# Patient Record
Sex: MALE | Race: AMERICAN INDIAN OR ALASKA NATIVE
[De-identification: names, ages, dates, MRNs, and addresses within clinical notes are randomized per-mention and may not be internally consistent; named-entity substitution may affect disease eponyms.]

---

## 2017-08-03 NOTE — EMERGENCY DEPARTMENT REPORT
HPI





- General


Chief Complaint: Hypoglycemia


Time Seen by Provider: 08/03/17 10:49





- HPI


HPI: 


This is a 61-year-old Afro-American male presents to the emergency department 

with complaint of some transient confusion and possible hypoglycemia.  The 

patient went out to his car around 5:30 AM to go to work and then woke up there 

now or so later not remembering that he went out to his car.  The patient has a 

history of insulin dependent diabetes and thought he might be hypoglycemic and 

took a sugar pill.  He then drove himself into the hospital but still felt 

foggy.  He is now had some peanut butter and juice and says that he feels 

improved but not yet back to 100%.  He denies any current headache, vision 

change, chest pain, shortness of breath.  He has a history of insulin-dependent 

diabetes for which she takes regular insulin on a sliding scale at meals and 70/

30 at 25 units twice daily.   He goes to a clinic in [facility] for his 

primary care needs.  He also has a history of hypertension.  No history of MI, 

CVA, PE/DVT.  No recent travel or sick contacts at home.








ED Past Medical Hx





- Past Medical History


Previous Medical History?: Yes


Hx Hypertension: Yes


Hx Diabetes: Yes





- Surgical History


Past Surgical History?: Yes


Hx Appendectomy: Yes





- Social History


Smoking Status: Current Some Day Smoker


Substance Use Type: Alcohol





- Medications


Home Medications: 


 Home Medications











 Medication  Instructions  Recorded  Confirmed  Last Taken  Type


 


Acetaminophen/Codeine [Tylenol #3] 1 tab PO Q6H PRN #10 tab 05/20/16 08/03/17 08 /03/17 Rx


 


Furosemide [Lasix] 20 mg PO QDAY #30 tablet 05/20/16 08/03/17 08/03/17 Rx


 


Insulin NPH/Regular [Novolin 70/30] 25 unit SQ BID 05/20/16 08/03/17 08/03/17 

History


 


Insulin Regular, Human [HumuLIN R] 1 unit SQ Q6HR 05/20/16 08/03/17 08/03/17 

History


 


amLODIPine [Norvasc] 5 mg PO DAILY #30 tab 05/20/16 08/03/17 08/03/17 Rx














ED Review of Systems


ROS: 


Stated complaint: CONFUSION


Other details as noted in HPI





Comment: All other systems reviewed and negative


Constitutional: denies: chills, fever


Eyes: denies: eye pain, eye discharge, vision change


ENT: denies: ear pain, throat pain


Respiratory: denies: cough, shortness of breath, wheezing


Cardiovascular: syncope.  denies: chest pain, palpitations


Gastrointestinal: denies: abdominal pain, nausea, diarrhea


Genitourinary: denies: urgency, dysuria


Musculoskeletal: denies: back pain, joint swelling, arthralgia


Skin: denies: rash, lesions


Neurological: confusion.  denies: headache, weakness, paresthesias





Physical Exam





- Physical Exam


Vital Signs: 


 Vital Signs











  08/03/17





  09:57


 


Temperature 97.3 F L


 


Pulse Rate 96 H


 


Respiratory 16





Rate 


 


Blood Pressure 160/97











Physical Exam: 


GENERAL: The patient is well-developed well-nourished.


HEENT: Normocephalic.  Atraumatic.  Extraocular motions are intact.  Patient 

has moist mucous membranes.  Pupils equal reactive to light bilaterally.  No 

nystagmus.


NECK: Supple.  Trachea is midline.


CHEST/LUNGS: Clear to auscultation.  There is no respiratory distress noted.


HEART/CARDIOVASCULAR: Regular.  There is no tachycardia.  There is no gallop 

rub or murmur.


ABDOMEN: Abdomen is soft, nontender.  Patient has normal bowel sounds.  There 

is no abdominal distention.


SKIN: Skin is warm and dry.


NEURO: The patient is awake, alert, and oriented.  The patient is cooperative.  

The patient has no focal neurologic deficits.  The patient has normal speech.  

Cranial nerves II through XII grossly intact.


MUSCULOSKELETAL: There is no tenderness or deformity.  There is no limitation 

range of motion.  There is no evidence of acute injury.  Muscle strength 5 out 

of 5 upper and lower extremities bilaterally.








ED Course


 Vital Signs











  08/03/17





  09:57


 


Temperature 97.3 F L


 


Pulse Rate 96 H


 


Respiratory 16





Rate 


 


Blood Pressure 160/97














ED Medical Decision Making





- Lab Data


Result diagrams: 


 08/03/17 10:17





 08/03/17 10:17





- EKG Data


-: EKG Interpreted by Me


EKG shows normal: sinus rhythm, axis, intervals, QRS complexes, ST-T waves


Rate: normal





- EKG Data


When compared to previous EKG there are: previous EKG unavailable


Interpretation: normal EKG





- Medical Decision Making


61-year-old male presents to the emergency department after he had some 

confusion and/or altered mental status earlier.  He checked his sugar and found 

himself to have low blood sugar and took a sugar pill at home.  Here the blood 

sugar was about 70 upon arrival.  He was given peanut butter and juice.  His 

blood sugar was rechecked about 2 hours later and his blood sugar level was 

about 30.  He was then given an amp of D50 and a full meal to eat.  His blood 

sugar went up to 180 and then about 250.  The patient was once again 

hypoglycemic he started feeling nauseated and "off", but when the patient had 

the D50 and full meal he says he again feeling much better and is currently 

back at baseline.  The patient admits that he is not sure whether he had any 

dinner last night or breakfast this morning but still did continue with his 

normal diabetes/insulin regiment.  This is most likely the reason for his 

symptoms today.  There is been no focal, motor or sensory deficits and his 

cranial nerves are intact throughout his ED course.  Vital signs stable.  He 

will be given referrals for primary care and encouraged to return to the 

emergency department with any worsening of symptoms or any acute distress.








- Differential Diagnosis


diabetic hypoglycemia, hypothyroidism, MI


Critical Care Time: No


Critical care attestation.: 


If time is entered above; I have spent that time in minutes in the direct care 

of this critically ill patient, excluding procedure time.








ED Disposition


Clinical Impression: 


 Diabetic hypoglycemia





Disposition: DC-01 TO HOME OR SELFCARE


Is pt being admited?: No


Condition: Stable


Instructions:  Diabetic Hypoglycemia (ED)


Additional Instructions: 


Please follow-up with a primary care physician in the next few days.  Make sure 

to eat 3 meals a day if you are going to take your insulin normally.  Keep a 

blood sugar log.  Return to the emergency department with any worsening of your 

symptoms or any acute distress.


Referrals: 


HOUSTON BROWN MD [Primary Care Provider] - 3-5 Days


ELLIE SOMMERS MD [Staff Physician] - 3-5 Days


[facility] [Outside] - 3-5 Days


[facility] [Outside] - 3-5 Days


[facility] [Outside] - 3-5 Days


Time of Disposition: 16:17

## 2019-03-02 ENCOUNTER — HOSPITAL ENCOUNTER (EMERGENCY)
Dept: HOSPITAL 5 - ED | Age: 64
Discharge: HOME | End: 2019-03-02
Payer: SELF-PAY

## 2019-03-02 DIAGNOSIS — R51: Primary | ICD-10-CM

## 2019-03-02 DIAGNOSIS — Z79.4: ICD-10-CM

## 2019-03-02 DIAGNOSIS — E11.9: ICD-10-CM

## 2019-03-02 DIAGNOSIS — H57.89: ICD-10-CM

## 2019-03-02 DIAGNOSIS — Z88.8: ICD-10-CM

## 2019-03-02 DIAGNOSIS — I10: ICD-10-CM

## 2019-03-02 DIAGNOSIS — R11.2: ICD-10-CM

## 2019-03-02 LAB
BASOPHILS # (AUTO): 0 K/MM3 (ref 0–0.1)
BASOPHILS NFR BLD AUTO: 0.4 % (ref 0–1.8)
BUN SERPL-MCNC: 10 MG/DL (ref 9–20)
BUN/CREAT SERPL: 10 %
CALCIUM SERPL-MCNC: 8.9 MG/DL (ref 8.4–10.2)
EOSINOPHIL # BLD AUTO: 0 K/MM3 (ref 0–0.4)
EOSINOPHIL NFR BLD AUTO: 0 % (ref 0–4.3)
HCT VFR BLD CALC: 45.4 % (ref 35.5–45.6)
HEMOLYSIS INDEX: 83
HGB BLD-MCNC: 16.2 GM/DL (ref 11.8–15.2)
LYMPHOCYTES # BLD AUTO: 0.8 K/MM3 (ref 1.2–5.4)
LYMPHOCYTES NFR BLD AUTO: 7.5 % (ref 13.4–35)
MCHC RBC AUTO-ENTMCNC: 36 % (ref 32–34)
MCV RBC AUTO: 92 FL (ref 84–94)
MONOCYTES # (AUTO): 0.8 K/MM3 (ref 0–0.8)
MONOCYTES % (AUTO): 7.3 % (ref 0–7.3)
PLATELET # BLD: 223 K/MM3 (ref 140–440)
RBC # BLD AUTO: 4.95 M/MM3 (ref 3.65–5.03)

## 2019-03-02 PROCEDURE — 36415 COLL VENOUS BLD VENIPUNCTURE: CPT

## 2019-03-02 PROCEDURE — 85025 COMPLETE CBC W/AUTO DIFF WBC: CPT

## 2019-03-02 PROCEDURE — 99284 EMERGENCY DEPT VISIT MOD MDM: CPT

## 2019-03-02 PROCEDURE — 96374 THER/PROPH/DIAG INJ IV PUSH: CPT

## 2019-03-02 PROCEDURE — 70450 CT HEAD/BRAIN W/O DYE: CPT

## 2019-03-02 PROCEDURE — 80048 BASIC METABOLIC PNL TOTAL CA: CPT

## 2019-03-02 PROCEDURE — 96375 TX/PRO/DX INJ NEW DRUG ADDON: CPT

## 2019-03-02 NOTE — CAT SCAN REPORT
CT HEAD/BRAIN WO CON 

 

CLINICAL INDICATION: 

Male, 63 years of age. headache 

 

COMPARISON: 

None 

 

TECHNIQUE: 

Contiguous axial images were obtained from the vertex through the skull base.This CT exam was perform
ed using one or more of the following dose reduction techniques: automated exposure control, adjustme
nt of the mA and/or kV according to patient size, or use of iterative reconstruction technique.  

 

FINDINGS: 

No acute intracranial hemorrhage, midline shift, or pathological extra-axial fluid collection.  There
 is age-related volume loss with compensatory dilatation of the ventricular system and prominence of 
the overlying sulci.  Patchy areas of decreased attenuation within the subcortical and periventricula
r white matter compatible with the sequelae of chronic small vessel ischemic disease. Small chronic a
ppearing lacunar infarcts in the bilateral basal ganglia. Ocular globes are grossly unremarkable. Sabas
varium is grossly intact. 

 

Mild mucosal thickening in the paranasal sinuses. Mastoid air cells are clear. 

 

IMPRESSION: 

 

No grossly acute intracranial abnormality. Mild volume loss and chronic small vessel ischemic disease
. 

 

  

 

This document is electronically signed by Matt Broderick DO., March 2 2019 06:11:30 PM ET

## 2019-03-03 VITALS — SYSTOLIC BLOOD PRESSURE: 172 MMHG | DIASTOLIC BLOOD PRESSURE: 73 MMHG

## 2019-03-10 ENCOUNTER — HOSPITAL ENCOUNTER (INPATIENT)
Dept: HOSPITAL 5 - ED | Age: 64
LOS: 3 days | Discharge: HOME | DRG: 71 | End: 2019-03-13
Attending: INTERNAL MEDICINE | Admitting: INTERNAL MEDICINE
Payer: SELF-PAY
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
## 2019-03-10 DIAGNOSIS — E86.0: ICD-10-CM

## 2019-03-10 DIAGNOSIS — I10: ICD-10-CM

## 2019-03-10 DIAGNOSIS — I42.6: ICD-10-CM

## 2019-03-10 DIAGNOSIS — Z83.3: ICD-10-CM

## 2019-03-10 DIAGNOSIS — F10.20: ICD-10-CM

## 2019-03-10 DIAGNOSIS — Z79.899: ICD-10-CM
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
## 2019-03-10 DIAGNOSIS — G93.40: Primary | ICD-10-CM
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
## 2019-03-10 DIAGNOSIS — Z82.49: ICD-10-CM

## 2019-03-10 DIAGNOSIS — Z90.49: ICD-10-CM

## 2019-03-10 DIAGNOSIS — E11.649: ICD-10-CM
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
## 2019-03-10 DIAGNOSIS — Z79.4: ICD-10-CM

## 2019-03-10 DIAGNOSIS — M62.82: ICD-10-CM

## 2019-03-10 DIAGNOSIS — R55: ICD-10-CM

## 2019-03-10 DIAGNOSIS — Z88.6: ICD-10-CM
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
## 2019-03-10 DIAGNOSIS — Z71.41: ICD-10-CM

## 2019-03-10 LAB
ALBUMIN SERPL-MCNC: 4.1 G/DL (ref 3.9–5)
ALT SERPL-CCNC: 24 UNITS/L (ref 7–56)
APTT BLD: 28.1 SEC. (ref 24.2–36.6)
BASOPHILS # (AUTO): 0.1 K/MM3 (ref 0–0.1)
BASOPHILS NFR BLD AUTO: 0.7 % (ref 0–1.8)
BENZODIAZEPINES SCREEN,URINE: (no result)
BILIRUB DIRECT SERPL-MCNC: 0.2 MG/DL (ref 0–0.2)
BILIRUB UR QL STRIP: (no result)
BLOOD UR QL VISUAL: (no result)
BUN SERPL-MCNC: 20 MG/DL (ref 9–20)
BUN/CREAT SERPL: 15 %
CALCIUM SERPL-MCNC: 8.9 MG/DL (ref 8.4–10.2)
EOSINOPHIL # BLD AUTO: 0 K/MM3 (ref 0–0.4)
EOSINOPHIL NFR BLD AUTO: 0 % (ref 0–4.3)
HCT VFR BLD CALC: 42.7 % (ref 35.5–45.6)
HDLC SERPL-MCNC: 96 MG/DL (ref 40–59)
HEMOLYSIS INDEX: 11
HGB BLD-MCNC: 15 GM/DL (ref 11.8–15.2)
INR PPP: 0.93 (ref 0.87–1.13)
LYMPHOCYTES # BLD AUTO: 0.8 K/MM3 (ref 1.2–5.4)
LYMPHOCYTES NFR BLD AUTO: 6.2 % (ref 13.4–35)
MCHC RBC AUTO-ENTMCNC: 35 % (ref 32–34)
MCV RBC AUTO: 92 FL (ref 84–94)
METHADONE SCREEN,URINE: (no result)
MONOCYTES # (AUTO): 1 K/MM3 (ref 0–0.8)
MONOCYTES % (AUTO): 7.8 % (ref 0–7.3)
OPIATE SCREEN,URINE: (no result)
PH UR STRIP: 7 [PH] (ref 5–7)
PLATELET # BLD: 208 K/MM3 (ref 140–440)
RBC # BLD AUTO: 4.64 M/MM3 (ref 3.65–5.03)
RBC #/AREA URNS HPF: 5 /HPF (ref 0–6)
UROBILINOGEN UR-MCNC: < 2 MG/DL (ref ?–2)
WBC #/AREA URNS HPF: < 1 /HPF (ref 0–6)

## 2019-03-10 PROCEDURE — 93010 ELECTROCARDIOGRAM REPORT: CPT

## 2019-03-10 PROCEDURE — 83036 HEMOGLOBIN GLYCOSYLATED A1C: CPT

## 2019-03-10 PROCEDURE — G0480 DRUG TEST DEF 1-7 CLASSES: HCPCS

## 2019-03-10 PROCEDURE — 70450 CT HEAD/BRAIN W/O DYE: CPT

## 2019-03-10 PROCEDURE — 82140 ASSAY OF AMMONIA: CPT

## 2019-03-10 PROCEDURE — 90686 IIV4 VACC NO PRSV 0.5 ML IM: CPT

## 2019-03-10 PROCEDURE — 81001 URINALYSIS AUTO W/SCOPE: CPT

## 2019-03-10 PROCEDURE — 83880 ASSAY OF NATRIURETIC PEPTIDE: CPT

## 2019-03-10 PROCEDURE — 93306 TTE W/DOPPLER COMPLETE: CPT

## 2019-03-10 PROCEDURE — 80048 BASIC METABOLIC PNL TOTAL CA: CPT

## 2019-03-10 PROCEDURE — 84484 ASSAY OF TROPONIN QUANT: CPT

## 2019-03-10 PROCEDURE — 82805 BLOOD GASES W/O2 SATURATION: CPT

## 2019-03-10 PROCEDURE — 80076 HEPATIC FUNCTION PANEL: CPT

## 2019-03-10 PROCEDURE — 93970 EXTREMITY STUDY: CPT

## 2019-03-10 PROCEDURE — 85730 THROMBOPLASTIN TIME PARTIAL: CPT

## 2019-03-10 PROCEDURE — 82962 GLUCOSE BLOOD TEST: CPT

## 2019-03-10 PROCEDURE — 93880 EXTRACRANIAL BILAT STUDY: CPT

## 2019-03-10 PROCEDURE — 85025 COMPLETE CBC W/AUTO DIFF WBC: CPT

## 2019-03-10 PROCEDURE — 70544 MR ANGIOGRAPHY HEAD W/O DYE: CPT

## 2019-03-10 PROCEDURE — 36415 COLL VENOUS BLD VENIPUNCTURE: CPT

## 2019-03-10 PROCEDURE — 70551 MRI BRAIN STEM W/O DYE: CPT

## 2019-03-10 PROCEDURE — 71045 X-RAY EXAM CHEST 1 VIEW: CPT

## 2019-03-10 PROCEDURE — 93005 ELECTROCARDIOGRAM TRACING: CPT

## 2019-03-10 PROCEDURE — 82550 ASSAY OF CK (CPK): CPT

## 2019-03-10 PROCEDURE — 82553 CREATINE MB FRACTION: CPT

## 2019-03-10 PROCEDURE — 80307 DRUG TEST PRSMV CHEM ANLYZR: CPT

## 2019-03-10 PROCEDURE — 83735 ASSAY OF MAGNESIUM: CPT

## 2019-03-10 PROCEDURE — 80320 DRUG SCREEN QUANTALCOHOLS: CPT

## 2019-03-10 PROCEDURE — 80061 LIPID PANEL: CPT

## 2019-03-10 PROCEDURE — 85610 PROTHROMBIN TIME: CPT

## 2019-03-10 RX ADMIN — FUROSEMIDE SCH MG: 20 TABLET ORAL at 16:22

## 2019-03-10 RX ADMIN — SODIUM CHLORIDE SCH MLS/HR: 0.45 INJECTION, SOLUTION INTRAVENOUS at 15:30

## 2019-03-10 RX ADMIN — SODIUM CHLORIDE SCH MLS/HR: 0.45 INJECTION, SOLUTION INTRAVENOUS at 22:48

## 2019-03-10 RX ADMIN — Medication SCH ML: at 21:10

## 2019-03-10 RX ADMIN — FOLIC ACID SCH MG: 1 TABLET ORAL at 12:47

## 2019-03-10 RX ADMIN — AMLODIPINE BESYLATE SCH MG: 5 TABLET ORAL at 16:23

## 2019-03-10 NOTE — HISTORY AND PHYSICAL REPORT
History of Present Illness


Chief complaint: 





I feel bad


History of present illness: 


64 YO Male with HTN, DM, ETOH Dependence presents to ED for evaluation. Pt 

states that he was in his usual state of health at bedtime around 2200 hrs, but 

he experienced generalized weakness with worsened symptoms in his right arm and 

leg and an acute onset of slurred speech shortly after awaking from sleep this 

morning. Pt transported to [facility] via private vehicle. Pt seen and evaluated in ED 

and found to have symptoms consistent with CVA as well as Rhabdomyolysis. Pt 

admitted to telemetry and initiated on CVA protocol. Pt lase ingstion of ETOH 

was on the night prior to admission. Pt denies fever, chills, CP, Palpitations, 

NVD, Trauma, BRBPR, Skin Rash, Unintentional weight loss, or night sweats. 

Neurology consulted in ED. 





Past History


Past Medical History: diabetes, hypertension


Past Surgical History: appendectomy


Social history: , lives with family, alcohol abuse


Family history: diabetes, hypertension





Medications and Allergies


                                    Allergies











Allergy/AdvReac Type Severity Reaction Status Date / Time


 


lisinopril Allergy  Unknown Verified 03/10/19 11:21











                                Home Medications











 Medication  Instructions  Recorded  Confirmed  Last Taken  Type


 


Acetaminophen/Codeine [Tylenol #3] 1 tab PO Q6H PRN #10 tab 05/20/16 08/03/17 08/03/17 Rx


 


Furosemide [Lasix] 20 mg PO QDAY #30 tablet 05/20/16 08/03/17 08/03/17 Rx


 


Insulin NPH/Regular [Novolin 70/30] 25 unit SQ BID 05/20/16 08/03/17 08/03/17 

History


 


Insulin Regular, Human [HumuLIN R] 1 unit SQ Q6HR 05/20/16 08/03/17 08/03/17 

History


 


amLODIPine [Norvasc] 5 mg PO DAILY #30 tab 05/20/16 08/03/17 08/03/17 Rx


 


Butalb/Acetamin/Caff -40 1 tab PO Q6HR PRN #10 tab 03/02/19  Unknown Rx





[Fioricet]     














Review of Systems


Constitutional: no weight loss, no weight gain, no fever, no chills


Ears, nose, mouth and throat: no ear pain, no ear discharge, no tinnitis, no 

decreased hearing, no nose pain, no nasal congestion


Cardiovascular: no chest pain, no orthopnea, no palpitations, no syncope


Respiratory: no cough, no cough with sputum, no excessive sputum, no hemoptysis,

 no shortness of breath


Gastrointestinal: no nausea, no vomiting, no diarrhea, no constipation


Genitourinary Male: no hematuria, no flank pain, no discharge, no urinary 

frequency, no urinary hesitancy


Rectal: no pain, no incontinence, no bleeding


Musculoskeletal: no neck stiffness, no neck pain, no shooting arm pain, no arm 

numbness/tingling, no low back pain, no shooting leg pain


Integumentary: no rash, no pruritis, no redness, no sores, no wounds


Neurological: weakness, aphasia, change in speech, gait dysfunction, motor 

disturbance, no seizures, no syncope, no tremors


Psychiatric: no anxiety, no memory loss, no change in sleep habits, no sleep 

disturbances, no hypersomnia


Endocrine: no cold intolerance, no heat intolerance, no polyphagia, no excessive

 thirst, no polydipsia, no polyuria


Hematologic/Lymphatic: no easy bruising, no easy bleeding, no lymphadenopathy, 

no lymphedema


Allergic/Immunologic: no urticaria, no allergic rhinitis, no wheezing





Exam





- Constitutional


Vitals: 


                                        











Temp Pulse Resp BP Pulse Ox


 


 98.6 F   94 H  20   172/91   98 


 


 03/10/19 07:27  03/10/19 07:27  03/10/19 07:27  03/10/19 07:27  03/10/19 07:27











General appearance: Present: mild distress





- EENT


Eyes: Present: PERRL


ENT: hearing intact, clear oral mucosa





- Neck


Neck: Present: supple, normal ROM





- Respiratory


Respiratory effort: normal


Respiratory: bilateral: CTA





- Cardiovascular


Heart Sounds: Present: S1 & S2.  Absent: rub, click





- Extremities


Extremities: pulses symmetrical, No edema


Peripheral Pulses: within normal limits





- Abdominal


General gastrointestinal: Present: soft, non-tender, non-distended, normal bowel

 sounds


Male genitourinary: Present: normal





- Integumentary


Integumentary: Present: clear, warm, dry





- Musculoskeletal


Musculoskeletal: generalized weakness





- Psychiatric


Psychiatric: appropriate mood/affect, intact judgment & insight





- Neurologic


Neurologic: CNII-XII intact, moves all extremities, no gait normal





Results





- Labs


CBC & Chem 7: 


                                 03/10/19 07:57





                                 03/10/19 07:57


Labs: 


                              Abnormal lab results











  03/10/19 03/10/19 03/10/19 Range/Units





  07:35 07:57 07:57 


 


WBC   12.4 H   (4.5-11.0)  K/mm3


 


MCHC   35 H   (32-34)  %


 


RDW   12.3 L   (13.2-15.2)  %


 


Lymph % (Auto)   6.2 L   (13.4-35.0)  %


 


Mono % (Auto)   7.8 H   (0.0-7.3)  %


 


Lymph #   0.8 L   (1.2-5.4)  K/mm3


 


Mono #   1.0 H   (0.0-0.8)  K/mm3


 


Seg Neutrophils %   85.3 H   (40.0-70.0)  %


 


Seg Neutrophils #   10.6 H   (1.8-7.7)  K/mm3


 


Sodium    136 L  (137-145)  mmol/L


 


Carbon Dioxide    21 L  (22-30)  mmol/L


 


Glucose    260 H  ()  mg/dL


 


POC Glucose  224 H    ()  


 


AST     (5-40)  units/L


 


Total Creatine Kinase     ()  units/L


 


CK-MB (CK-2)     (0.0-4.0)  ng/mL


 


Troponin T     (0.00-0.029)  ng/mL


 


NT-Pro-B Natriuret Pep     (0-900)  pg/mL


 


Cholesterol     ()  mg/dL


 


HDL Cholesterol     (40-59)  mg/dL














  03/10/19 03/10/19 Range/Units





  07:57 07:57 


 


WBC    (4.5-11.0)  K/mm3


 


MCHC    (32-34)  %


 


RDW    (13.2-15.2)  %


 


Lymph % (Auto)    (13.4-35.0)  %


 


Mono % (Auto)    (0.0-7.3)  %


 


Lymph #    (1.2-5.4)  K/mm3


 


Mono #    (0.0-0.8)  K/mm3


 


Seg Neutrophils %    (40.0-70.0)  %


 


Seg Neutrophils #    (1.8-7.7)  K/mm3


 


Sodium    (137-145)  mmol/L


 


Carbon Dioxide    (22-30)  mmol/L


 


Glucose    ()  mg/dL


 


POC Glucose    ()  


 


AST   72 H  (5-40)  units/L


 


Total Creatine Kinase  4205 H   ()  units/L


 


CK-MB (CK-2)  19.5 H   (0.0-4.0)  ng/mL


 


Troponin T   0.050 H  (0.00-0.029)  ng/mL


 


NT-Pro-B Natriuret Pep   2942 H  (0-900)  pg/mL


 


Cholesterol   208 H  ()  mg/dL


 


HDL Cholesterol   96 H  (40-59)  mg/dL














Assessment and Plan





- Patient Problems


(1) CVA (cerebral vascular accident)


Current Visit: Yes   Status: Acute   


Qualifiers: 


   Precerebral and cerebral artery: middle cerebral artery   Laterality of 

affected vessel: left 


Plan to address problem: 


Stroke Protocol: Admit to telemetry, CT head, MRI Brain, MRA Brain, Echo, 

Carotid Doppler, Echo, PT/OT/Speech, Antiplatelet therapy, lipid panel, statin 

therapy, 








(2) EtOH dependence


Current Visit: Yes   Status: Acute   


Qualifiers: 


   Substance use status: uncomplicated   Qualified Code(s): F10.20 - Alcohol 

dependence, uncomplicated   


Plan to address problem: 


Thiamine, Folic Acid, Multivitamin, CIWA protocol,








(3) Cardiomyopathy


Current Visit: Yes   Status: Acute   


Qualifiers: 


   Cardiomyopathy type: alcoholic   Qualified Code(s): I42.6 - Alcoholic 

cardiomyopathy   


Plan to address problem: 


Supportive care, Strict I/O, Daily weight, ETOH cessation, BNP, monitor uop q 

shift, 








(4) Rhabdomyolysis


Current Visit: Yes   Status: Acute   


Qualifiers: 


   Rhabdomyolysis type: non-traumatic   Qualified Code(s): M62.82 - 

Rhabdomyolysis   


Plan to address problem: 


IVF resuscitation therapy, IV bicarbonate, CK level in AM, urinalysis, monitor 

uop q shift, monitor serum creatnine for signs of renal failure. 








(5) DVT prophylaxis


Current Visit: Yes   Status: Acute   


Plan to address problem: 


SCD to BLE while in bed.

## 2019-03-10 NOTE — EMERGENCY DEPARTMENT REPORT
ED General Adult HPI





- General


Chief complaint: Hyperglycemia


Stated complaint: DIABETES


Time Seen by Provider: 03/10/19 07:45


Source: patient


Mode of arrival: Ambulatory


Limitations: No Limitations





- History of Present Illness


Initial comments: 


Is a 63-year-old man who thought it was a good idea to drink alcohol to raise 

his blood sugar when he found it to be low at about or p.m. yesterday he states.

 He states he had some slurred speech but it has improved.  His bit nonspecific 

about the amount of alcohol he typically drinks her how much she drank last 

night.  He is an insulin-dependent diabetic.  He is obviously poorly managing 

his blood sugar but I believe he is compliant with his insulin.  At time my exam

the patient does not complain of headache.  However he states he was seen here 2

days ago for a bad headache and had a CT examination.


In actuality, the patient was seen on 03/02/2019 rather than 2 days ago:





63-year-old male with temporal headache since this morning.  Patient initially 

hypertensive upon ED arrival.  Neurologic exam normal.  CT scan did not show any

acute findings.  Patient was given morphine, Zofran, Reglan, Benadryl.  Patient 

reported improvement of pain following medication administration.  The patient 

currently improved to 142/69. Patient has tolerated PO, ate an entire meal tray 

here in ED.  Will discharge at this time.  Return precautions given. Outpatient 

follow-up advised.





Again at this time patient complains of no headache.  He denies any focal 

weakness or numbness.  He doesn't report any difficulty with coordination gait 

or vision.


-: Gradual


Severity scale (0 -10): 0





- Related Data


                                Home Medications











 Medication  Instructions  Recorded  Confirmed  Last Taken


 


Insulin NPH/Regular [Novolin 70/30] 25 unit SQ BID 05/20/16 08/03/17 08/03/17


 


Insulin Regular, Human [HumuLIN R] 1 unit SQ Q6HR 05/20/16 08/03/17 08/03/17








                                  Previous Rx's











 Medication  Instructions  Recorded  Last Taken  Type


 


Acetaminophen/Codeine [Tylenol #3] 1 tab PO Q6H PRN #10 tab 05/20/16 08/03/17 Rx


 


Furosemide [Lasix] 20 mg PO QDAY #30 tablet 05/20/16 08/03/17 Rx


 


amLODIPine [Norvasc] 5 mg PO DAILY #30 tab 05/20/16 08/03/17 Rx


 


Butalb/Acetamin/Caff -40 1 tab PO Q6HR PRN #10 tab 03/02/19 Unknown Rx





[Fioricet]    











                                    Allergies











Allergy/AdvReac Type Severity Reaction Status Date / Time


 


lisinopril Allergy  Unknown Verified 03/10/19 07:30














ED Review of Systems


ROS: 


Stated complaint: DIABETES


Other details as noted in HPI





Constitutional: denies: chills, fever


Eyes: denies: eye pain, eye discharge, vision change


ENT: denies: ear pain, throat pain


Respiratory: denies: cough, shortness of breath, wheezing


Cardiovascular: denies: chest pain, palpitations


Endocrine: no symptoms reported


Gastrointestinal: denies: abdominal pain, nausea, diarrhea


Genitourinary: denies: urgency, dysuria


Musculoskeletal: denies: back pain, joint swelling, arthralgia


Skin: denies: rash, lesions


Neurological: other (slurred speech).  denies: headache, weakness, paresthesias


Psychiatric: denies: anxiety, depression


Hematological/Lymphatic: denies: easy bleeding, easy bruising





ED Past Medical Hx





- Past Medical History


Hx Hypertension: Yes


Hx Diabetes: Yes





- Surgical History


Hx Appendectomy: Yes





- Social History


Smoking Status: Never Smoker


Substance Use Type: None





- Medications


Home Medications: 


                                Home Medications











 Medication  Instructions  Recorded  Confirmed  Last Taken  Type


 


Acetaminophen/Codeine [Tylenol #3] 1 tab PO Q6H PRN #10 tab 05/20/16 08/03/17 08/03/17 Rx


 


Furosemide [Lasix] 20 mg PO QDAY #30 tablet 05/20/16 08/03/17 08/03/17 Rx


 


Insulin NPH/Regular [Novolin 70/30] 25 unit SQ BID 05/20/16 08/03/17 08/03/17 

History


 


Insulin Regular, Human [HumuLIN R] 1 unit SQ Q6HR 05/20/16 08/03/17 08/03/17 

History


 


amLODIPine [Norvasc] 5 mg PO DAILY #30 tab 05/20/16 08/03/17 08/03/17 Rx


 


Butalb/Acetamin/Caff -40 1 tab PO Q6HR PRN #10 tab 03/02/19  Unknown Rx





[Fioricet]     














ED Physical Exam





- General


Limitations: No Limitations


General appearance: alert, in no apparent distress





- Head


Head exam: Present: atraumatic, normocephalic





- Eye


Eye exam: Present: normal appearance, PERRL, EOMI.  Absent: scleral icterus





- ENT


ENT exam: Present: mucous membranes moist





- Neck


Neck exam: Present: normal inspection.  Absent: tenderness, meningismus





- Respiratory


Respiratory exam: Present: normal lung sounds bilaterally.  Absent: respiratory 

distress





- Cardiovascular


Cardiovascular Exam: Present: regular rate, normal rhythm.  Absent: systolic 

murmur, diastolic murmur, rubs, gallop





- GI/Abdominal


GI/Abdominal exam: Present: soft, normal bowel sounds.  Absent: distended, 

tenderness, guarding, rebound, rigid





- Rectal


Rectal exam: Present: deferred





- Extremities Exam


Extremities exam: Present: normal inspection





- Back Exam


Back exam: Present: normal inspection





- Neurological Exam


Neurological exam: Present: alert, oriented X3, CN II-XII intact, other (the 

patient's NIH stroke score is 0.  His speech is not slurred.).  Absent: motor 

sensory deficit





- Psychiatric


Psychiatric exam: Present: normal affect, normal mood





- Skin


Skin exam: Present: warm, dry, intact, normal color.  Absent: rash





ED Course


                                   Vital Signs











  03/10/19





  07:27


 


Temperature 98.6 F


 


Pulse Rate 94 H


 


Respiratory 20





Rate 


 


Blood Pressure 172/91





[Right] 


 


O2 Sat by Pulse 98





Oximetry 














- Reevaluation(s)


Reevaluation #1: 


Patient is found to have evidence of cardiomyopathy and rhabdomyolysis.  His 

urine drug screen is here pending.  His alcohol level was actually not s

ignificantly presents.  He is diabetic.  I think it would be worthwhile to admit

 him for treatment of his rhabdomyolysis which will have to be done in a very 

gradual fashion considering his cardiomyopathy, as well as, further evaluation 

of his "slurred speech", i.e., stroke workup.


03/10/19 10:50





Reevaluation #2: 


Discussed with Dr. Nelson.  He will admit.


03/10/19 11:13








ED Medical Decision Making





- Lab Data


Result diagrams: 


                                 03/10/19 07:57





                                 03/10/19 07:57








                         Laboratory Results - last 24 hr











  03/10/19 03/10/19 03/10/19





  07:35 07:57 07:57


 


WBC   12.4 H 


 


RBC   4.64 


 


Hgb   15.0 


 


Hct   42.7 


 


MCV   92 


 


MCH   32 


 


MCHC   35 H 


 


RDW   12.3 L 


 


Plt Count   208 


 


Lymph % (Auto)   6.2 L 


 


Mono % (Auto)   7.8 H 


 


Eos % (Auto)   0.0 


 


Baso % (Auto)   0.7 


 


Lymph #   0.8 L 


 


Mono #   1.0 H 


 


Eos #   0.0 


 


Baso #   0.1 


 


Seg Neutrophils %   85.3 H 


 


Seg Neutrophils #   10.6 H 


 


PT   


 


INR   


 


APTT   


 


VBG pH   


 


Sodium    136 L


 


Potassium    4.6


 


Chloride    99.4


 


Carbon Dioxide    21 L


 


Anion Gap    20


 


BUN    20


 


Creatinine    1.3


 


Estimated GFR    > 60


 


BUN/Creatinine Ratio    15


 


Glucose    260 H


 


POC Glucose  224 H  


 


Lactic Acid   


 


Calcium    8.9


 


Magnesium   


 


Total Bilirubin   


 


Direct Bilirubin   


 


Indirect Bilirubin   


 


AST   


 


ALT   


 


Alkaline Phosphatase   


 


Ammonia   


 


Total Creatine Kinase   


 


CK-MB (CK-2)   


 


CK-MB (CK-2) Rel Index   


 


Troponin T   


 


NT-Pro-B Natriuret Pep   


 


Total Protein   


 


Albumin   


 


Albumin/Globulin Ratio   


 


Triglycerides   


 


Cholesterol   


 


LDL Cholesterol Direct   


 


HDL Cholesterol   


 


Cholesterol/HDL Ratio   


 


Plasma/Serum Alcohol   














  03/10/19 03/10/19 03/10/19





  07:57 07:57 07:57


 


WBC   


 


RBC   


 


Hgb   


 


Hct   


 


MCV   


 


MCH   


 


MCHC   


 


RDW   


 


Plt Count   


 


Lymph % (Auto)   


 


Mono % (Auto)   


 


Eos % (Auto)   


 


Baso % (Auto)   


 


Lymph #   


 


Mono #   


 


Eos #   


 


Baso #   


 


Seg Neutrophils %   


 


Seg Neutrophils #   


 


PT   


 


INR   


 


APTT   


 


VBG pH  7.406  


 


Sodium   


 


Potassium   


 


Chloride   


 


Carbon Dioxide   


 


Anion Gap   


 


BUN   


 


Creatinine   


 


Estimated GFR   


 


BUN/Creatinine Ratio   


 


Glucose   


 


POC Glucose   


 


Lactic Acid   


 


Calcium   


 


Magnesium   


 


Total Bilirubin   


 


Direct Bilirubin   


 


Indirect Bilirubin   


 


AST   


 


ALT   


 


Alkaline Phosphatase   


 


Ammonia   


 


Total Creatine Kinase   4205 H 


 


CK-MB (CK-2)   19.5 H 


 


CK-MB (CK-2) Rel Index   0.4 


 


Troponin T   


 


NT-Pro-B Natriuret Pep   


 


Total Protein   


 


Albumin   


 


Albumin/Globulin Ratio   


 


Triglycerides   


 


Cholesterol   


 


LDL Cholesterol Direct   


 


HDL Cholesterol   


 


Cholesterol/HDL Ratio   


 


Plasma/Serum Alcohol    < 0.01














  03/10/19 03/10/19 03/10/19





  07:57 07:57 07:57


 


WBC   


 


RBC   


 


Hgb   


 


Hct   


 


MCV   


 


MCH   


 


MCHC   


 


RDW   


 


Plt Count   


 


Lymph % (Auto)   


 


Mono % (Auto)   


 


Eos % (Auto)   


 


Baso % (Auto)   


 


Lymph #   


 


Mono #   


 


Eos #   


 


Baso #   


 


Seg Neutrophils %   


 


Seg Neutrophils #   


 


PT  13.0  


 


INR  0.93  


 


APTT  28.1  


 


VBG pH   


 


Sodium   


 


Potassium   


 


Chloride   


 


Carbon Dioxide   


 


Anion Gap   


 


BUN   


 


Creatinine   


 


Estimated GFR   


 


BUN/Creatinine Ratio   


 


Glucose   


 


POC Glucose   


 


Lactic Acid    1.80


 


Calcium   


 


Magnesium   2.30 


 


Total Bilirubin   0.80 


 


Direct Bilirubin   0.2 


 


Indirect Bilirubin   0.6 


 


AST   72 H 


 


ALT   24 


 


Alkaline Phosphatase   72 


 


Ammonia   


 


Total Creatine Kinase   


 


CK-MB (CK-2)   


 


CK-MB (CK-2) Rel Index   


 


Troponin T   0.050 H 


 


NT-Pro-B Natriuret Pep   2942 H 


 


Total Protein   7.0 


 


Albumin   4.1 


 


Albumin/Globulin Ratio   1.4 


 


Triglycerides   112 


 


Cholesterol   208 H 


 


LDL Cholesterol Direct   115 


 


HDL Cholesterol   96 H 


 


Cholesterol/HDL Ratio   2.16 


 


Plasma/Serum Alcohol   














  03/10/19





  07:57


 


WBC 


 


RBC 


 


Hgb 


 


Hct 


 


MCV 


 


MCH 


 


MCHC 


 


RDW 


 


Plt Count 


 


Lymph % (Auto) 


 


Mono % (Auto) 


 


Eos % (Auto) 


 


Baso % (Auto) 


 


Lymph # 


 


Mono # 


 


Eos # 


 


Baso # 


 


Seg Neutrophils % 


 


Seg Neutrophils # 


 


PT 


 


INR 


 


APTT 


 


VBG pH 


 


Sodium 


 


Potassium 


 


Chloride 


 


Carbon Dioxide 


 


Anion Gap 


 


BUN 


 


Creatinine 


 


Estimated GFR 


 


BUN/Creatinine Ratio 


 


Glucose 


 


POC Glucose 


 


Lactic Acid 


 


Calcium 


 


Magnesium 


 


Total Bilirubin 


 


Direct Bilirubin 


 


Indirect Bilirubin 


 


AST 


 


ALT 


 


Alkaline Phosphatase 


 


Ammonia  35.0


 


Total Creatine Kinase 


 


CK-MB (CK-2) 


 


CK-MB (CK-2) Rel Index 


 


Troponin T 


 


NT-Pro-B Natriuret Pep 


 


Total Protein 


 


Albumin 


 


Albumin/Globulin Ratio 


 


Triglycerides 


 


Cholesterol 


 


LDL Cholesterol Direct 


 


HDL Cholesterol 


 


Cholesterol/HDL Ratio 


 


Plasma/Serum Alcohol 














- Radiology Data


Radiology results: report reviewed (chest x-ray no acute process)


IMPRESSION: 





1. Mild chronic microvascular ischemic disease throughout the centrum semiovale,

 but no 


discernible acute infarction seen. Consider followup MRI with diffusion-weighted

 imaging to rule out


occult acute infarction if clinically warranted. 





2. Atherosclerotic disease of the carotid siphons. 





3. No skull fracture, intracranial hemorrhage, mass lesion, midline shift, or 

hydrocephalus seen. 








This document is electronically signed by Rosibel Clinton MD., March 10 2019

 09:38:55 AM ET 





Critical care attestation.: 


If time is entered above; I have spent that time in minutes in the direct care 

of this critically ill patient, excluding procedure time.








ED Disposition


Clinical Impression: 


 Dysarthria, Poorly-controlled hypertension





Rhabdomyolysis


Qualifiers:


 Rhabdomyolysis type: non-traumatic Qualified Code(s): M62.82 - Rhabdomyolysis





Cardiomyopathy


Qualifiers:


 Cardiomyopathy type: unspecified Qualified Code(s): I42.9 - Cardiomyopathy, 

unspecified





Type 2 diabetes mellitus


Qualifiers:


 Diabetes mellitus long term insulin use: with long term use Diabetes mellitus c

omplication status: without complication Qualified Code(s): E11.9 - Type 2 

diabetes mellitus without complications





Disposition: DC-09 OP ADMIT IP TO THIS HOSP


Is pt being admited?: Yes


Does the pt Need Aspirin: Yes


Condition: Stable


Instructions:  Diabetes Mellitus Type 2 in Adults (ED)


Referrals: 


PRIMARY CARE,MD [Primary Care Provider] - 3-5 Days


Time of Disposition: 10:53

## 2019-03-10 NOTE — VASCULAR LAB REPORT
PROCEDURE:  VL CAROTID DUPLEX BILAT 

 

TECHNIQUE:  Duplex Doppler ultrasound of the common, internal and external carotid arteries and the v
ertebral arteries was performed bilaterally. Gray scale imaging, velocity spectral waveform analysis,
 and color flow Doppler were employed.  

 

HISTORY: stroke 

 

COMPARISONS:  None . 

 

Note: Measurement of carotid stenosis is based on flow velocity values that correlate with the [place] 
[place] Symptomatic Carotid Endarterectomy Trial (NASCET) based stenosis criteria using the internal
 carotid artery diameter as the denominator for stenosis calculation. 

 

FINDINGS:  

 

RIGHT carotid artery: 

Velocities:  ICA PSV: 94 cm/sec  ICA End diastolic: 32 cm/sec  CCA PSV: 124 cm/sec   IC/CC ratio: 0.7
5     

Plaque/color flow:  Mild heterogeneous plaque without significant spectral broadening or abnormal col
or flow . 

 

RIGHT vertebral artery:  Antegrade systolic and diastolic flow 

 

LEFT carotid artery: 

Velocities:  ICA PSV: 103 cm/sec  ICA End diastolic: 18 cm/sec  CCA PSV: 131 cm/sec   IC/CC ratio: 0.
79     

Plaque/color flow:  Mild heterogeneous plaque without significant spectral broadening or abnormal col
or flow . 

 

LEFT vertebral artery:  Antegrade systolic and diastolic flow 

 

IMPRESSION:   

 

1.  RIGHT carotid:  No hemodynamically significant (less than 50 percent) internal carotid artery kiran
nosis. 

2.  LEFT carotid:  No hemodynamically significant (less than 50 percent) internal carotid artery sten
osis. 

3.  Vertebral arteries:  Bilaterally antegrade. 

 

 

This document is electronically signed by Yosvany Harrison MD., March 10 2019 01:53:09 PM ET

## 2019-03-10 NOTE — CAT SCAN REPORT
EXAM:    CT HEAD/BRAIN WO CON 

 

HISTORY: slurred speech 

 

TECHNIQUE:  Spiral axial CT images are obtained through the brain without the administration of intra
venous contrast. 

 

COMPARISON: None available. 

 

FINDINGS:   

 

There are parenchymal lucencies within the white matter tracks of the centrum semiovale, consistent w
ith chronic sequela of atherosclerotic microvascular ischemic disease. Atherosclerosis of the intracr
anial ICAs is seen. 

 

 

There is mild diffuse cerebral cortical atrophy. The centrum semiovale, basal ganglia, cerebellum, an
d brainstem are otherwise grossly unremarkable for a noncontrast CT scan.  There is no acute intracra
nial hemorrhage, gross acute infarction, mass lesion, midline shift, or hydrocephalus seen.  No extra
-axial mass or abnormal fluid collection noted. 

 

The calvarium is intact.  The partially imaged paranasal sinuses, middle ear cavities and mastoid air
 cells are clear. 

 

IMPRESSION: 

 

1.  Mild chronic microvascular ischemic disease throughout the centrum semiovale, but no discernible 
acute infarction seen. Consider followup MRI with diffusion-weighted imaging to rule out occult acute
 infarction if clinically warranted.  

 

2.  Atherosclerotic disease of the carotid siphons. 

 

3.  No skull fracture, intracranial hemorrhage, mass lesion, midline shift, or hydrocephalus seen.  

 

This document is electronically signed by Rosibel Clinton MD., March 10 2019 09:38:55 AM ET

## 2019-03-10 NOTE — XRAY REPORT
EXAM:   XR CHEST 1V AP 

 

HISTORY:  hypertension 

 

TECHNIQUE: AP chest x-ray dated 3/10/2019 at 7:59 AM. 

 

COMPARISON:  None available. 

 

FINDINGS: 

 

The heart size and mediastinum are within normal limits.  The lung fields and costophrenic angles are
 clear.  There is no acute parenchymal infiltrate, pleural effusion, or pneumothorax seen.  The visua
lized bony structures are within normal limits. 

 

IMPRESSION: 

 

1.  No evidence for acute cardiopulmonary disease seen. 

 

  

 

This document is electronically signed by Rosibel Clinton MD., March 10 2019 08:37:52 AM ET

## 2019-03-11 RX ADMIN — FOLIC ACID SCH MG: 1 TABLET ORAL at 09:02

## 2019-03-11 RX ADMIN — ACETAMINOPHEN AND CODEINE PHOSPHATE PRN TAB: 300; 30 TABLET ORAL at 20:10

## 2019-03-11 RX ADMIN — Medication SCH ML: at 09:00

## 2019-03-11 RX ADMIN — SODIUM CHLORIDE SCH MLS/HR: 0.45 INJECTION, SOLUTION INTRAVENOUS at 08:54

## 2019-03-11 RX ADMIN — ONDANSETRON PRN MG: 2 INJECTION INTRAMUSCULAR; INTRAVENOUS at 00:23

## 2019-03-11 RX ADMIN — ACETAMINOPHEN AND CODEINE PHOSPHATE PRN TAB: 300; 30 TABLET ORAL at 02:18

## 2019-03-11 RX ADMIN — Medication SCH ML: at 21:57

## 2019-03-11 RX ADMIN — FUROSEMIDE SCH MG: 20 TABLET ORAL at 09:00

## 2019-03-11 RX ADMIN — AMLODIPINE BESYLATE SCH MG: 5 TABLET ORAL at 09:00

## 2019-03-11 NOTE — MAGNETIC RESONANCE REPORT
PROCEDURE: MR BRAIN WO CON 

 

HISTORY: stroke 

 

FINDINGS: MRI of the brain was performed using sagittal T1, axial diffusion, axial T2, axial FLAIR, a
xial T1 and coronal FLAIR images. Comparison is made with CT examination of March 10. 

 

These images demonstrate that there is a complete corpus callosum. There is no Chiari malformation. 

 

Diffusion-weighted images demonstrate no acute transcortical or acute lacunar infarct. There are norm
al flow voids in the vertebral arteries, basilar artery and both internal carotid arteries. 

 

The mastoid air cells and middle ears appear clear. There is no evidence of acute sinusitis. 

 

There are old bilateral basal ganglia lacunar infarcts. There are mild chronic appearing small vessel
 ischemic white matter changes in the subcortical and periventricular white matter. 

 

IMPRESSION: 

No acute transcortical or acute lacunar infarct 

 

This document is electronically signed by Mathew Navarro MD., March 11 2019 07:49:37 PM ET

## 2019-03-11 NOTE — CONSULTATION
History of Present Illness


Consult date: 03/11/19


Requesting physician: PRATIK JETT


Reason for Consult: left facial weakness and numbness


History of present illness: 





63 yr. old rt. handed male with history of insulin dependent diabetes, 

hypertension and alcohol dependence, presented to ER on 3/10/19 because of low 

blood sugar.  He recalls getting in his truck, then passing out for several 

hours.  Upon awakening he drove himself to the ER, feeling that his legs were 

weak and speech slurred.  He denies numbness or tingling of his extremities, 

denies problems with vision, headache, nausea, diaphoresis, chest pain, 

palpitations, dizziness or vertigo.  He does not feel that one side was weake 

than the other.  CPK was found to be elevated on admission.  CT brain revealed 

chronic microvascular disease.





Past History


Past Medical History: diabetes, hypertension


Past Surgical History: appendectomy


Social history: , lives with family, alcohol abuse


Family history: diabetes, hypertension





Medications and Allergies


                                    Allergies











Allergy/AdvReac Type Severity Reaction Status Date / Time


 


lisinopril Allergy  Unknown Verified 03/10/19 11:21











                                Home Medications











 Medication  Instructions  Recorded  Confirmed  Last Taken  Type


 


Acetaminophen/Codeine [Tylenol #3] 1 tab PO Q6H PRN #10 tab 05/20/16 03/10/19 

03/09/19 Rx


 


Furosemide [Lasix] 20 mg PO QDAY #30 tablet 05/20/16 03/10/19 03/09/19 Rx


 


Insulin NPH/Regular [Novolin 70/30] 25 unit SQ BID 05/20/16 03/10/19 03/09/19 

History


 


Insulin Regular, Human [HumuLIN R] 1 unit SQ Q6HR 05/20/16 03/10/19 03/09/19 

History


 


amLODIPine [Norvasc] 5 mg PO DAILY #30 tab 05/20/16 03/10/19 03/09/19 Rx


 


Butalb/Acetamin/Caff -40 1 tab PO Q6HR PRN #10 tab 03/02/19 03/10/19 

03/09/19 Rx





[Fioricet]     











Active Meds: 


Active Medications





Acetaminophen (Tylenol)  650 mg PO Q4H PRN


   PRN Reason: Pain, Mild (1-3)


Acetaminophen/Butalbital/Caffeine (Fioricet)  1 tab PO Q6H PRN


   PRN Reason: Headache


   Last Admin: 03/10/19 21:09 Dose:  1 tab


   Documented by: 


Acetaminophen/Codeine Phosphate (Tylenol #3)  1 tab PO Q6H PRN


   PRN Reason: Pain, Moderate


   Last Admin: 03/11/19 02:18 Dose:  1 tab


   Documented by: 


Albuterol (Proventil)  2.5 mg IH Q3H PRN


   PRN Reason: Shortness Of Breath


Amlodipine Besylate (Norvasc)  5 mg PO DAILY [facility]


   Last Admin: 03/11/19 09:00 Dose:  5 mg


   Documented by: 


Atorvastatin Calcium (Lipitor)  40 mg PO QHS [facility]


   Last Admin: 03/10/19 21:09 Dose:  40 mg


   Documented by: 


Bisacodyl (Dulcolax)  10 mg MI QDAY PRN


   PRN Reason: Constipation


Folic Acid (Folvite)  1 mg PO QDAY [facility]


   Last Admin: 03/11/19 09:02 Dose:  1 mg


   Documented by: 


Furosemide (Lasix)  20 mg PO QDAY [facility]


   Last Admin: 03/11/19 09:00 Dose:  20 mg


   Documented by: 


Sodium Chloride (Nacl 0.45% 1000 Ml)  1,000 mls @ 100 mls/hr IV AS DIRECT [facility]


   Last Admin: 03/11/19 08:54 Dose:  100 mls/hr


   Documented by: 


Insulin Human Isoph/Insulin Regular (Humulin 70/30)  25 unit SUB-Q BIDDIAB [facility]


   Last Admin: 03/11/19 19:21 Dose:  25 unit


   Documented by: 


Insulin Human Regular (Humulin R)  1 units SUB-Q Q6HR [facility]


   Last Admin: 03/11/19 19:22 Dose:  1 units


   Documented by: 


Lorazepam (Ativan)  2 mg IV Q1HR PRN


   PRN Reason: CIWA-Ar 8-15


Magnesium Hydroxide (Milk Of Magnesia)  30 ml PO Q4H PRN


   PRN Reason: Constipation


Metoclopramide HCl (Reglan)  10 mg PO Q6H PRN


   PRN Reason: Nausea And Vomiting


Ondansetron HCl (Zofran)  4 mg IV Q8H PRN


   PRN Reason: Nausea And Vomiting


   Last Admin: 03/11/19 00:23 Dose:  4 mg


   Documented by: 


Promethazine HCl (Phenergan)  25 mg MI Q6H PRN


   PRN Reason: Nausea And Vomiting


Sodium Chloride (Sodium Chloride Flush Syringe 10 Ml)  10 ml IV BID [facility]


   Last Admin: 03/11/19 09:00 Dose:  10 ml


   Documented by: 


Sodium Chloride (Sodium Chloride Flush Syringe 10 Ml)  10 ml IV PRN PRN


   PRN Reason: LINE FLUSH


Sodium Chloride (Sodium Chloride Flush Syringe 10 Ml)  10 ml IV PRN PRN


   PRN Reason: LINE FLUSH











Review of Systems


Constitutional: no sweats, no weakness


Ears, nose, mouth and throat: nasal congestion, sinus pressure, sinus pain, no 

headache, no vertigo


Cardiovascular: no chest pain, no palpitations, no rapid/irregular heart beat, 

no edema, no syncope, no lightheadedness, no shortness of breath


Respiratory: no cough, no shortness of breath, no congestion


Gastrointestinal: no nausea, no vomiting, no diarrhea, no constipation


Genitourinary Male: no dysuria, no urinary frequency


Musculoskeletal: other (bilateral leg pain, muscle dorsiflexors and extensors of

 calves)


Integumentary: no pruritis


Neurological: lack of coordination, gait dysfunction, no head injury, no 

transient paralysis, no weakness, no parathesias, no numbness, no ataxia, no v

ertigo, no headaches, no double vision, no loss of vision, no hearing 

difficulties





Physical Examination





- Vital Signs


Vital Signs: 


                                   Vital Signs











Temp Pulse Resp BP Pulse Ox


 


 98.6 F   94 H  20   172/91   98 


 


 03/10/19 07:27  03/10/19 07:27  03/10/19 07:27  03/10/19 07:27  03/10/19 07:27














- Physical Exam


Narrative exam: 





General - 


Resting comfortably in bed.





Neurological exam - 


Speech fluent, relates history well.


CNs - EOMs intact, no nystagmus.  V-1 thru V-3 intact bilaterally, face 

symmetric


   hearing intact, tongue midline.


Motor - 5/5 throughout.


Reflexes - trace throughout.


Sensory - intact to touch and pin.  


Cerebellar - FTN, FFM, aRymundo intact.





Results





- Laboratory Findings


CBC and BMP: 


                                 03/10/19 07:57





                                 03/10/19 07:57


Abnormal Lab Findings: 


                                  Abnormal Labs











  03/10/19 03/10/19 03/10/19





  07:35 07:57 07:57


 


WBC   12.4 H 


 


MCHC   35 H 


 


RDW   12.3 L 


 


Lymph % (Auto)   6.2 L 


 


Mono % (Auto)   7.8 H 


 


Lymph #   0.8 L 


 


Mono #   1.0 H 


 


Seg Neutrophils %   85.3 H 


 


Seg Neutrophils #   10.6 H 


 


Sodium    136 L


 


Carbon Dioxide    21 L


 


Glucose    260 H


 


POC Glucose  224 H  


 


Hemoglobin A1c   


 


AST   


 


Total Creatine Kinase   


 


CK-MB (CK-2)   


 


Troponin T   


 


NT-Pro-B Natriuret Pep   


 


Cholesterol   


 


HDL Cholesterol   














  03/10/19 03/10/19 03/10/19





  07:57 07:57 07:57


 


WBC   


 


MCHC   


 


RDW   


 


Lymph % (Auto)   


 


Mono % (Auto)   


 


Lymph #   


 


Mono #   


 


Seg Neutrophils %   


 


Seg Neutrophils #   


 


Sodium   


 


Carbon Dioxide   


 


Glucose   


 


POC Glucose   


 


Hemoglobin A1c    8.8 H


 


AST   72 H 


 


Total Creatine Kinase  4205 H  


 


CK-MB (CK-2)  19.5 H  


 


Troponin T   0.050 H 


 


NT-Pro-B Natriuret Pep   2942 H 


 


Cholesterol   208 H 


 


HDL Cholesterol   96 H 














  03/10/19 03/10/19 03/11/19





  16:52 23:53 05:40


 


WBC   


 


MCHC   


 


RDW   


 


Lymph % (Auto)   


 


Mono % (Auto)   


 


Lymph #   


 


Mono #   


 


Seg Neutrophils %   


 


Seg Neutrophils #   


 


Sodium   


 


Carbon Dioxide   


 


Glucose   


 


POC Glucose  224 H  218 H 


 


Hemoglobin A1c   


 


AST   


 


Total Creatine Kinase    2387 H


 


CK-MB (CK-2)   


 


Troponin T   


 


NT-Pro-B Natriuret Pep   


 


Cholesterol   


 


HDL Cholesterol   














Assessment and Plan





63 yr old insulin dependent diabetic, presented with  spell of hypoglycemia and 

rhabdomyolosis.  No acute changes noted on CT.  MRI brain pending.  Suspect his 

balance difficulty and slurred speech may have been due to hypoglycemia, 

possible ETOH intake.  





Plan - Await MRI results,


   Continue fluids for rhabdomyolysis.  


   Regulate insulin.

## 2019-03-11 NOTE — PROGRESS NOTE
Assessment and Plan








(1) CVA (cerebral vascular accident)


Current Visit: Yes   Status: Acute   


Qualifiers: 


   Precerebral and cerebral artery: middle cerebral artery   Laterality of 

affected vessel: left 


Plan to address problem: 


Stroke Protocol: F/u with CT head, MRI Brain, MRA Brain, Echo, Carotid Doppler, 

Echo, PT/OT/Speech, Antiplatelet therapy, lipid panel, statin therapy, 








(2) EtOH dependence


Current Visit: Yes   Status: Acute   


Qualifiers: 


   Substance use status: uncomplicated   Qualified Code(s): F10.20 - Alcohol 

dependence, uncomplicated   


Plan to address problem: 


Thiamine, Folic Acid, Multivitamin, CIWA protocol,








(3) Cardiomyopathy


Current Visit: Yes   Status: Acute   


Qualifiers: 


   Cardiomyopathy type: alcoholic   Qualified Code(s): I42.6 - Alcoholic 

cardiomyopathy   


Plan to address problem: 


Supportive care, Strict I/O, Daily weight, ETOH cessation, BNP, monitor uop q 

shift, 








(4) Rhabdomyolysis


Current Visit: Yes   Status: Acute   


Qualifiers: 


   Rhabdomyolysis type: non-traumatic   Qualified Code(s): M62.82 - 

Rhabdomyolysis   


Plan to address problem: 


IVF resuscitation therapy, IV bicarbonate, CK level in AM, urinalysis, monitor 

uop q shift, monitor serum creatnine for signs of renal failure. 








(5) DVT prophylaxis


Current Visit: Yes   Status: Acute   


Plan to address problem: 


SCD to BLE while in bed. 








Subjective


Date of service: 03/11/19


Interval history: 





pt seen and examined. No new complaint





Objective





- Constitutional


Vitals: 


                               Vital Signs - 12hr











  03/11/19 03/11/19 03/11/19





  02:18 03:18 05:03


 


Temperature   98.4 F


 


Pulse Rate   73


 


Respiratory 17 16 20





Rate   


 


Blood Pressure   148/80


 


O2 Sat by Pulse   98





Oximetry   














  03/11/19





  12:55


 


Temperature 99.2 F


 


Pulse Rate 86


 


Respiratory 18





Rate 


 


Blood Pressure 133/69


 


O2 Sat by Pulse 97





Oximetry 











General appearance: Present: no acute distress, well-nourished





- EENT


Eyes: PERRL, EOM intact


Ears: bilateral: normal





- Neck


Neck: supple, normal ROM





- Respiratory


Respiratory effort: normal


Respiratory: bilateral: CTA





- Cardiovascular


Rhythm: regular


Heart Sounds: Present: S1 & S2.  Absent: gallop, rub


Extremities: pulses intact, No edema, normal color, Full ROM





- Gastrointestinal


General gastrointestinal: Present: soft, non-tender, non-distended, normal bowel

sounds





- Integumentary


Integumentary: clear, warm, dry





- Musculoskeletal


Musculoskeletal: strength equal bilaterally





- Neurologic


Neurologic: focal deficits (left hemiparesis)





- Psychiatric


Psychiatric: memory intact, appropriate mood/affect, intact judgment & insight





- Labs


CBC & Chem 7: 


                                 03/10/19 07:57





                                 03/10/19 07:57


Labs: 


                              Abnormal lab results











  03/10/19 03/10/19 03/11/19 Range/Units





  16:52 23:53 05:40 


 


POC Glucose  224 H  218 H   ()  


 


Total Creatine Kinase    2387 H  ()  units/L

## 2019-03-11 NOTE — MAGNETIC RESONANCE REPORT
PROCEDURE: MR MRA/MRV HEAD WO CON 

 

HISTORY: stroke 

 

FINDINGS: 

MRA of the intracranial arterial vasculature was performed and compared to the MRI performed the same
 day as well as to the CT head performed March 10. Data was reformatted in multiple projections. 

 

These images demonstrate that both vertebral arteries are patent. The basilar artery is patent. Both 
posterior cerebral arteries are small vessels but appear patent. The right posterior cerebral artery 
is largely perfused from the anterior circulation, a normal variant. 

 

In the anterior circulation the internal carotid, middle cerebral and anterior cerebral arteries appe
ar patent. No aneurysm is seen. 

 

IMPRESSION: The intracranial arterial vasculature appears patent 

 

This document is electronically signed by Mathew Navarro MD., March 11 2019 07:52:23 PM ET

## 2019-03-12 LAB
ALBUMIN SERPL-MCNC: 3.4 G/DL (ref 3.9–5)
ALT SERPL-CCNC: 30 UNITS/L (ref 7–56)
BASOPHILS # (AUTO): 0 K/MM3 (ref 0–0.1)
BASOPHILS NFR BLD AUTO: 0.4 % (ref 0–1.8)
BILIRUB DIRECT SERPL-MCNC: < 0.2 MG/DL (ref 0–0.2)
BUN SERPL-MCNC: 12 MG/DL (ref 9–20)
BUN/CREAT SERPL: 12 %
CALCIUM SERPL-MCNC: 8.2 MG/DL (ref 8.4–10.2)
EOSINOPHIL # BLD AUTO: 0.1 K/MM3 (ref 0–0.4)
EOSINOPHIL NFR BLD AUTO: 1.2 % (ref 0–4.3)
HCT VFR BLD CALC: 41.3 % (ref 35.5–45.6)
HEMOLYSIS INDEX: 6
HGB BLD-MCNC: 14.6 GM/DL (ref 11.8–15.2)
LYMPHOCYTES # BLD AUTO: 0.9 K/MM3 (ref 1.2–5.4)
LYMPHOCYTES NFR BLD AUTO: 15.4 % (ref 13.4–35)
MCHC RBC AUTO-ENTMCNC: 35 % (ref 32–34)
MCV RBC AUTO: 91 FL (ref 84–94)
MONOCYTES # (AUTO): 0.7 K/MM3 (ref 0–0.8)
MONOCYTES % (AUTO): 12 % (ref 0–7.3)
PLATELET # BLD: 185 K/MM3 (ref 140–440)
RBC # BLD AUTO: 4.53 M/MM3 (ref 3.65–5.03)

## 2019-03-12 RX ADMIN — FUROSEMIDE SCH MG: 20 TABLET ORAL at 10:40

## 2019-03-12 RX ADMIN — AMLODIPINE BESYLATE SCH MG: 5 TABLET ORAL at 10:40

## 2019-03-12 RX ADMIN — SODIUM CHLORIDE SCH MLS/HR: 0.45 INJECTION, SOLUTION INTRAVENOUS at 07:53

## 2019-03-12 RX ADMIN — Medication SCH ML: at 21:51

## 2019-03-12 RX ADMIN — FOLIC ACID SCH MG: 1 TABLET ORAL at 10:40

## 2019-03-12 RX ADMIN — Medication SCH ML: at 10:44

## 2019-03-12 NOTE — PROGRESS NOTE
Assessment and Plan


Assessment and plan: 


Patient is a 62 yo man with a history of insulin dependent diabetes, 

hypertension and alcohol dependence who presented to ER on 3/10/19 because of 

low blood sugar. He recalls getting in his truck, then passing out for several 

hours.  Upon awakening he drove himself to the ER, feeling that his legs were w

eak and speech slurred.  He was admitted for AMS/syncope and rhabdomyolysis 

because CPK was found to be elevated on admission.  CT brain revealed chronic 

microvascular disease. MRA/MRI brain negative for acute stroke. 





-Acute Encephalopathy, most likely dehydration/hypoglycemia related


-Syncope, vasoVagal most likely


-Acute Rhabdomyolysis trauma from fall/syncope with bilateral posterior leg 

pains: treat with ivf, daily CPK level and once levels <1000,will d/c


-Type 2 DM on insulin: treat with ssi





Disposition: continue inpatient care, daily CPK level and once levels <1000,will

d/c home








History


Interval history: 


Patient was seen and examined. Follow-up on current diagnosis of rhabomyolysis. 

Overnight uneventful. Patient denies any chest pain, shortness breath, 

nausea/vomiting or severe headaches. Imaging, nursing note, chart, labs and old 

chart reviewed. Discussed with patient








Hospitalist Physical





- Physical exam


Narrative exam: 


GEN: WDWN, NAD, Awake, Alert, Orientated 


HEENT: NCAT, EOMI, PERRL, OP Clear 


NECK: supple, no adenopathy, no thyromegaly, no JVD 


CVS/HEART: RRR, normal S1S2, pulses present bilaterally 


CHEST/LUNGS: CTA B, Symmetrical chest expansion, good air entry bilaterally 


GI/Abdomen: soft, NTND, good bowel sounds, no guarding or rebound 


/Bladder: no suprapubic tenderness, no CVA or paraspinal tenderness 


EXT/Skin: no c/c/e, no obvious rash 


MSK: FROM x 4 


Neuro: CN 2-12 grossly intact, no new focal deficits 


Psych: calm 





- Constitutional


Vitals: 


                                        











Temp Pulse Resp BP Pulse Ox


 


 98.4 F   85   18   146/76   96 


 


 03/12/19 11:25  03/12/19 11:25  03/12/19 11:25  03/12/19 11:25  03/12/19 11:25











General appearance: Present: no acute distress, well-nourished





Results





- Labs


CBC & Chem 7: 


                                 03/12/19 04:49





                                 03/12/19 04:49


Labs: 


                             Laboratory Last Values











WBC  6.0 K/mm3 (4.5-11.0)   03/12/19  04:49    


 


RBC  4.53 M/mm3 (3.65-5.03)   03/12/19  04:49    


 


Hgb  14.6 gm/dl (11.8-15.2)   03/12/19  04:49    


 


Hct  41.3 % (35.5-45.6)   03/12/19  04:49    


 


MCV  91 fl (84-94)   03/12/19  04:49    


 


MCH  32 pg (28-32)   03/12/19  04:49    


 


MCHC  35 % (32-34)  H  03/12/19  04:49    


 


RDW  12.5 % (13.2-15.2)  L  03/12/19  04:49    


 


Plt Count  185 K/mm3 (140-440)   03/12/19  04:49    


 


Lymph % (Auto)  15.4 % (13.4-35.0)   03/12/19  04:49    


 


Mono % (Auto)  12.0 % (0.0-7.3)  H  03/12/19  04:49    


 


Eos % (Auto)  1.2 % (0.0-4.3)   03/12/19  04:49    


 


Baso % (Auto)  0.4 % (0.0-1.8)   03/12/19  04:49    


 


Lymph #  0.9 K/mm3 (1.2-5.4)  L  03/12/19  04:49    


 


Mono #  0.7 K/mm3 (0.0-0.8)   03/12/19  04:49    


 


Eos #  0.1 K/mm3 (0.0-0.4)   03/12/19  04:49    


 


Baso #  0.0 K/mm3 (0.0-0.1)   03/12/19  04:49    


 


Seg Neutrophils %  71.0 % (40.0-70.0)  H  03/12/19  04:49    


 


Seg Neutrophils #  4.3 K/mm3 (1.8-7.7)   03/12/19  04:49    


 


PT  13.0 Sec. (12.2-14.9)   03/10/19  07:57    


 


INR  0.93  (0.87-1.13)   03/10/19  07:57    


 


APTT  28.1 Sec. (24.2-36.6)   03/10/19  07:57    


 


VBG pH  7.406  (7.320-7.420)   03/10/19  07:57    


 


Sodium  138 mmol/L (137-145)   03/12/19  04:49    


 


Potassium  3.8 mmol/L (3.6-5.0)   03/12/19  04:49    


 


Chloride  104.0 mmol/L ()   03/12/19  04:49    


 


Carbon Dioxide  23 mmol/L (22-30)   03/12/19  04:49    


 


Anion Gap  15 mmol/L  03/12/19  04:49    


 


BUN  12 mg/dL (9-20)   03/12/19  04:49    


 


Creatinine  1.0 mg/dL (0.8-1.5)   03/12/19  04:49    


 


Estimated GFR  > 60 ml/min  03/12/19  04:49    


 


BUN/Creatinine Ratio  12 %  03/12/19  04:49    


 


Glucose  180 mg/dL ()  H  03/12/19  04:49    


 


POC Glucose  218  ()  H  03/10/19  23:53    


 


Hemoglobin A1c  8.8 % (4-6)  H  03/10/19  07:57    


 


Lactic Acid  1.80 mmol/L (0.7-2.0)   03/10/19  07:57    


 


Calcium  8.2 mg/dL (8.4-10.2)  L  03/12/19  04:49    


 


Magnesium  2.30 mg/dL (1.7-2.3)   03/10/19  07:57    


 


Total Bilirubin  0.40 mg/dL (0.1-1.2)   03/12/19  04:49    


 


Direct Bilirubin  < 0.2 mg/dL (0-0.2)   03/12/19  04:49    


 


Indirect Bilirubin  0.6 mg/dL  03/10/19  07:57    


 


AST  41 units/L (5-40)  H  03/12/19  04:49    


 


ALT  30 units/L (7-56)   03/12/19  04:49    


 


Alkaline Phosphatase  62 units/L ()   03/12/19  04:49    


 


Ammonia  35.0 umol/L (25-60)   03/10/19  07:57    


 


Total Creatine Kinase  2387 units/L ()  H  03/11/19  05:40    


 


CK-MB (CK-2)  19.5 ng/mL (0.0-4.0)  H  03/10/19  07:57    


 


CK-MB (CK-2) Rel Index  0.4  (0-4)   03/10/19  07:57    


 


Troponin T  0.050 ng/mL (0.00-0.029)  H  03/10/19  07:57    


 


NT-Pro-B Natriuret Pep  2942 pg/mL (0-900)  H  03/10/19  07:57    


 


Total Protein  5.8 g/dL (6.3-8.2)  L  03/12/19  04:49    


 


Albumin  3.4 g/dL (3.9-5)  L  03/12/19  04:49    


 


Albumin/Globulin Ratio  1.4 %  03/12/19  04:49    


 


Triglycerides  112 mg/dL (2-149)   03/10/19  07:57    


 


Cholesterol  208 mg/dL ()  H  03/10/19  07:57    


 


LDL Cholesterol Direct  115 mg/dL ()   03/10/19  07:57    


 


HDL Cholesterol  96 mg/dL (40-59)  H  03/10/19  07:57    


 


Cholesterol/HDL Ratio  2.16 %  03/10/19  07:57    


 


Urine Color  Yellow  (Yellow)   03/10/19  11:41    


 


Urine Turbidity  Clear  (Clear)   03/10/19  11:41    


 


Urine pH  7.0  (5.0-7.0)   03/10/19  11:41    


 


Ur Specific Gravity  1.011  (1.003-1.030)   03/10/19  11:41    


 


Urine Protein  100 mg/dl mg/dL (Negative)   03/10/19  11:41    


 


Urine Glucose (UA)  >=500 mg/dL (Negative)   03/10/19  11:41    


 


Urine Ketones  Neg mg/dL (Negative)   03/10/19  11:41    


 


Urine Blood  Mod  (Negative)   03/10/19  11:41    


 


Urine Nitrite  Neg  (Negative)   03/10/19  11:41    


 


Urine Bilirubin  Neg  (Negative)   03/10/19  11:41    


 


Urine Urobilinogen  < 2.0 mg/dL (<2.0)   03/10/19  11:41    


 


Ur Leukocyte Esterase  Neg  (Negative)   03/10/19  11:41    


 


Urine WBC (Auto)  < 1.0 /HPF (0.0-6.0)   03/10/19  11:41    


 


Urine RBC (Auto)  5.0 /HPF (0.0-6.0)   03/10/19  11:41    


 


Urine Opiates Screen  Presumptive negative   03/10/19  11:41    


 


Urine Methadone Screen  Presumptive negative   03/10/19  11:41    


 


Ur Barbiturates Screen  Presumptive negative   03/10/19  11:41    


 


Ur Phencyclidine Scrn  Presumptive negative   03/10/19  11:41    


 


Ur Amphetamines Screen  Presumptive negative   03/10/19  11:41    


 


U Benzodiazepines Scrn  Presumptive negative   03/10/19  11:41    


 


Urine Cocaine Screen  Presumptive negative   03/10/19  11:41    


 


U Marijuana (THC) Screen  Presumptive negative   03/10/19  11:41    


 


Drugs of Abuse Note  Disclamer   03/10/19  11:41    


 


Plasma/Serum Alcohol  < 0.01 % (0-0.07)   03/10/19  07:57

## 2019-03-13 VITALS — DIASTOLIC BLOOD PRESSURE: 74 MMHG | SYSTOLIC BLOOD PRESSURE: 134 MMHG

## 2019-03-13 RX ADMIN — Medication SCH ML: at 09:32

## 2019-03-13 RX ADMIN — AMLODIPINE BESYLATE SCH MG: 5 TABLET ORAL at 09:32

## 2019-03-13 RX ADMIN — ONDANSETRON PRN MG: 2 INJECTION INTRAMUSCULAR; INTRAVENOUS at 09:43

## 2019-03-13 RX ADMIN — FUROSEMIDE SCH MG: 20 TABLET ORAL at 09:32

## 2019-03-13 RX ADMIN — SODIUM CHLORIDE SCH MLS/HR: 0.45 INJECTION, SOLUTION INTRAVENOUS at 01:49

## 2019-03-13 RX ADMIN — FOLIC ACID SCH MG: 1 TABLET ORAL at 09:32

## 2019-03-13 NOTE — VASCULAR LAB REPORT
PROCEDURE: VL VENOUS DUPLEX LE BILAT 

 

TECHNIQUE:  Duplex Doppler imaging of the veins of the bilateral lower extremities was performed. 

 

HISTORY: bilateral leg pains 

 

COMPARISONS: None.  

 

FINDINGS: 

 

The veins of the right lower extremity are patent, compressible, and demonstrate normal waveforms and
 augmentation. 

 

The veins of the left lower extremity are patent, compressible, and demonstrate normal waveforms and 
augmentation. 

 

IMPRESSION:  

 

No evidence of deep venous thrombosis in the bilateral lower extremities. 

 

This document is electronically signed by Lorie Waldron MD., March 13 2019 12:37:21 PM ET